# Patient Record
Sex: MALE | Race: AMERICAN INDIAN OR ALASKA NATIVE
[De-identification: names, ages, dates, MRNs, and addresses within clinical notes are randomized per-mention and may not be internally consistent; named-entity substitution may affect disease eponyms.]

---

## 2018-07-02 ENCOUNTER — HOSPITAL ENCOUNTER (OUTPATIENT)
Dept: HOSPITAL 5 - GIO | Age: 73
Discharge: HOME | End: 2018-07-02
Attending: INTERNAL MEDICINE
Payer: MEDICARE

## 2018-07-02 VITALS — DIASTOLIC BLOOD PRESSURE: 62 MMHG | SYSTOLIC BLOOD PRESSURE: 108 MMHG

## 2018-07-02 DIAGNOSIS — Z87.891: ICD-10-CM

## 2018-07-02 DIAGNOSIS — Z86.010: ICD-10-CM

## 2018-07-02 DIAGNOSIS — Z79.899: ICD-10-CM

## 2018-07-02 DIAGNOSIS — Z98.890: ICD-10-CM

## 2018-07-02 DIAGNOSIS — I10: ICD-10-CM

## 2018-07-02 DIAGNOSIS — K31.89: ICD-10-CM

## 2018-07-02 DIAGNOSIS — D64.9: ICD-10-CM

## 2018-07-02 DIAGNOSIS — Z79.82: ICD-10-CM

## 2018-07-02 DIAGNOSIS — D50.9: ICD-10-CM

## 2018-07-02 DIAGNOSIS — K21.9: ICD-10-CM

## 2018-07-02 DIAGNOSIS — K44.9: ICD-10-CM

## 2018-07-02 DIAGNOSIS — K29.50: Primary | ICD-10-CM

## 2018-07-02 DIAGNOSIS — E78.00: ICD-10-CM

## 2018-07-02 PROCEDURE — 43239 EGD BIOPSY SINGLE/MULTIPLE: CPT

## 2018-07-02 PROCEDURE — 88305 TISSUE EXAM BY PATHOLOGIST: CPT

## 2018-07-02 PROCEDURE — 88342 IMHCHEM/IMCYTCHM 1ST ANTB: CPT

## 2018-07-02 NOTE — DISCHARGE SUMMARY
Short Stay Discharge Plan


Activity: advance as tolerated


Weight Bearing Status: Weight Bear as Tolerated


Diet: regular


Follow up with: 


DAVID ARTEAGA MD [Primary Care Provider] - 7 Days

## 2018-07-02 NOTE — OPERATIVE REPORT
Operative Report


Operative Report: 


Date: 07/02/2018    


Operative Report: 





Date of procedure: 02/05/2018





Procedure: Esophagogastroduodenoscopy with multiple mucosal biopsies.





Attending physician: James Choudhury MD





: James Choudhury MD





Indication: Patient is a 73 -year-old male who presented with a history of iron 

deficiency anemia, recurrent epigastric pain, heartburn and indigestion and 

dysphagia.  An upper endoscopy is done to assess patient, so that treatment may 

be directed based on the findings.





Consent: Informed consent was obtained after advising the patient and family 

regarding nature of this procedure, its indications, potential benefits as well 

as possible complications including but not limited to bleeding perforation and 

adverse reaction to medication, infection as well as other cardiopulmonary 

complications.  An informed written and verbal consent was then obtained after 

due opportunity was provided for questions and answers.





Monitoring: Patient was monitored continuously with pulse oximetry and 

electrocardiographic recordings as well as blood pressure recordings.  Vital 

signs remained stable throughout this procedure with no untoward events.





Preoperative assessment: Patient was assessed immediately prior to this 

procedure for capacity to tolerate monitored anesthesia care and moderate 

sedation as well as general anesthesia.  Patient's ASA classification is 2,  

Mallampati class is 2, Hyomental distance is 3.





Instrument: Triptelligentn video endoscope





Medications: Propofol given intravenously in divided doses.  For details please 

refer to anesthesia records.





Description of procedure: Patient was placed in the left lateral decubitus 

position after achieving sedation, the endoscope was introduced into the 

esophagus under direct vision.  It was then advanced beyond the esophagus into 

the stomach and then beyond the stomach into the duodenum and to the second 

portion of the duodenum.  It was subsequently withdrawn with careful inspection 

of all mucosal surfaces with the following findings.





Findings: Patient has  an irregular Z line at 39 cm.  There was a small hiatal 

hernia seen on entering into the stomach. There was mild erythema in the 

gastric antrum.    Biopsies of the antrum were obtained for histopathology.  

The duodenum was normal to second portion.





Impression:.Irregular Z line. 


Gastric antral erythema 


Hiatal Hernia





Plan: Continue treatment with proton pump inhibitors.


Follow pathology report.


Direct additional treatment based on the pathology report.


Patient will be observed clinically.  Additional recommendations will be made 

follow-up.

## 2018-07-02 NOTE — PROGRESS NOTE
Objective





- Constitutional


Vitals: 


 Vital Signs - 12hr











  07/02/18 07/02/18





  14:55 15:08


 


Temperature 98.2 F 98.2 F


 


Pulse Rate 64 64


 


Respiratory 15 15





Rate  


 


Blood Pressure 117/70 117/70


 


O2 Sat by Pulse 100 100





Oximetry

## 2018-07-02 NOTE — ANESTHESIA CONSULTATION
Anesthesia Consult and Med Hx





- Airway


Anesthetic Teeth Evaluation: Dentures


ROM Head & Neck: Adequate


Mental/Hyoid Distance: Adequate


Mallampati Class: Class III


Intubation Access Assessment: Good





- Pulmonary Exam


CTA: Yes





- Cardiac Exam


Cardiac Exam: No Murmur





- Pre-Operative Health Status


ASA Pre-Surgery Classification: ASA3


Proposed Anesthetic Plan: General





- Cardiovascular System


Hx Hypertension: Yes





- Hematic


Hx Anemia: Yes